# Patient Record
Sex: FEMALE | Race: WHITE | NOT HISPANIC OR LATINO | URBAN - METROPOLITAN AREA
[De-identification: names, ages, dates, MRNs, and addresses within clinical notes are randomized per-mention and may not be internally consistent; named-entity substitution may affect disease eponyms.]

---

## 2024-06-08 DIAGNOSIS — Z30.9 ENCOUNTER FOR CONTRACEPTIVE MANAGEMENT, UNSPECIFIED: ICD-10-CM

## 2024-06-13 RX ORDER — ESTRADIOL VALERATE AND ESTRADIOL VALERATE/DIENOGEST 3-2-1(28)
1 KIT ORAL DAILY
Qty: 84 TABLET | Refills: 0 | Status: SHIPPED | OUTPATIENT
Start: 2024-06-13

## 2024-06-21 ENCOUNTER — APPOINTMENT (OUTPATIENT)
Dept: OBSTETRICS AND GYNECOLOGY | Facility: CLINIC | Age: 54
End: 2024-06-21
Payer: COMMERCIAL

## 2024-06-21 VITALS
DIASTOLIC BLOOD PRESSURE: 60 MMHG | SYSTOLIC BLOOD PRESSURE: 106 MMHG | BODY MASS INDEX: 21.69 KG/M2 | HEIGHT: 66 IN | WEIGHT: 135 LBS

## 2024-06-21 DIAGNOSIS — N93.9 ABNORMAL UTERINE BLEEDING: Primary | ICD-10-CM

## 2024-06-21 DIAGNOSIS — Z12.31 SCREENING MAMMOGRAM FOR BREAST CANCER: ICD-10-CM

## 2024-06-21 PROCEDURE — 99396 PREV VISIT EST AGE 40-64: CPT | Performed by: OBSTETRICS & GYNECOLOGY

## 2024-06-21 PROCEDURE — 1036F TOBACCO NON-USER: CPT | Performed by: OBSTETRICS & GYNECOLOGY

## 2024-06-21 RX ORDER — OMEPRAZOLE 40 MG/1
40 CAPSULE, DELAYED RELEASE ORAL
COMMUNITY

## 2024-06-21 RX ORDER — VALACYCLOVIR HYDROCHLORIDE 500 MG/1
500 TABLET, FILM COATED ORAL 2 TIMES DAILY
COMMUNITY

## 2024-06-21 RX ORDER — CETIRIZINE HYDROCHLORIDE 10 MG/1
10 TABLET ORAL DAILY
COMMUNITY

## 2024-06-21 RX ORDER — ESTRADIOL VALERATE AND ESTRADIOL VALERATE/DIENOGEST 3-2-1(28)
1 KIT ORAL DAILY
Qty: 28 TABLET | Refills: 11 | Status: SHIPPED | OUTPATIENT
Start: 2024-06-21 | End: 2024-07-19

## 2024-06-21 RX ORDER — MONTELUKAST SODIUM 10 MG/1
10 TABLET ORAL NIGHTLY
COMMUNITY

## 2024-06-21 RX ORDER — ASCORBIC ACID 250 MG
250 TABLET ORAL DAILY
COMMUNITY

## 2024-06-21 NOTE — PROGRESS NOTES
Subjective   Patient ID: Betina Aggarwal is a 54 y.o. female who presents for Well woman visit.  Review of my note from annual exam 2023,,     Rubens Huerta MD  Physician  Specialty: Obstetrics and Gynecology     Progress Notes      Signed     Encounter Date: 4/28/2023    Signed           Patient Discussion/Summary     Established patient annual exam. 53 years old. 4 children. Prior endometrial ablation     Pap 2021 normal.     She has been on Natazia oral contraceptive. No history of heart attack stroke blood clots or breast cancer     We have reviewed the minimal risk of staying on oral contraceptives including 3-10 in 10,000 risk of DVT     She states that in March she went on a long trip with her  and had swelling redness and discomfort in the right calf that settled down after a few days. She never had this evaluated.     I would recommend she obtain a duplex ultrasound of the right leg to rule out any thrombotic event.     Exam breast abdominal pelvic exams are normal.     Woman exam. We will refill her Natazia but obtain an ultrasound of the right leg. Her daughter is a nurse and also has expressed concerns about her being on the pill and having a blood clot. Discussed that a potential DVT would be a contraindication to remaining on the pill      Chief Complaint     Here for annual exam.      Review of Systems  Constitutional: no fever,~no chills,~no recent weight gain,~no recent weight loss~and~no fatigue.   Eyes: no eye pain,~no vision problems~and~no dryness of the eyes.   ENT: no hearing loss,~no nosebleeds~and~no sinus congestion.   Cardiovascular: no chest pain,~no palpitations~and~no orthopnea.   Respiratory: no shortness of breath,~no cough~and~no wheezing.   Gastrointestinal: no abdominal pain,~no constipation,~no nausea,~no diarrhea~and~no vomiting.   Genitourinary: no dysuria,~no urinary incontinence,~no vaginal dryness,~no vaginal itching,~no dyspareunia,~no pelvic pain,~no dysmenorrhea,~no  sexual problems,~no change in urinary frequency,~no vaginal discharge,~no unexplained vaginal bleeding~and~no lesion/sore.   Musculoskeletal: no back pain,~no joint swelling~and~no leg edema.   Integumentary: no rashes,~no skin lesions,~no nipple discharge,~no breast pain~and~no breast lump.   Neurological: no headache,~no numbness~and~no dizziness.   Psychiatric: no sleep disturbances,~no anxiety~and~no depression.   Endocrine: no hot flashes,~no loss of hair~and~no hirsutism.   Hematologic/Lymphatic: no swollen glands,~no tendency for easy bleeding~and~no tendency for easy bruising.   All other systems have been reviewed and are negative for complaint.      Active Problems  Problems    · Abnormal color of tongue (529.8) (K14.8)   · Acute bronchitis (466.0) (J20.9)   · Acute sinusitis (461.9) (J01.90)   · Cervical smear, as part of routine gynecological examination (V76.2) (Z01.419)   · Chest pain (786.50) (R07.9)   · Contraception management (V25.9) (Z30.9)   · Cough (786.2) (R05.9)   · Encounter for Papanicolaou smear of cervix (V76.2) (Z12.4)   · Encounter for routine gynecological examination (V72.31) (Z01.419)   · Encounter for screening for lipoid disorders (V77.91) (Z13.220)   · Fatigue (780.79) (R53.83)   · Hematuria (599.70) (R31.9)   · IBS (irritable bowel syndrome) (564.1) (K58.9)   · Nausea (787.02) (R11.0)   · Pharyngitis (462) (J02.9)   · Visit for screening mammogram (V76.12) (Z12.31)     Past Medical History  Problems    · IBS (irritable bowel syndrome) (564.1) (K58.9)     Surgical History  Problems    · History of Hernia Repair   · left inguinal.  2005.   · History of Tubal Ligation     Family History  Mother    · Family history of cerebrovascular accident (CVA) (V17.1) (Z82.3)   · age 72.   · Family history of hyperlipidemia (V18.19) (Z83.438)  Father    · Family history of Throat cancer  Brother    · Family history of Malignant melanoma     Social History  Problems    · Never a smoker   · Never  smoker   · Parent   · 4 children.  son with svt.   · Rarely consumes alcohol (V49.89) (Z78.9)     Allergies  Medication    · No Known Drug Allergies  Recorded By: Irma Contreras; 2013 1:49:53 PM     Current Meds       Medication Name Instruction  Natkathy 3/2-2/2-3/1 MG Oral Tablet TAKE 1 TABLET DAILY AS DIRECTED.     Vitals  Vital Signs       Recorded: 2023 09:45AM  Systolic 112  Diastolic 62  Height 5 ft 6 in  Weight 132 lb   BMI Calculated 21.31 kg/m2  BSA Calculated 1.68  LMP [id number]   5  Para 4     Physical Exam     Constitutional: Alert and in no acute distress. Well developed, well nourished   Head and Face: Head and face: normal   Eyes: Normal external exam - nonicteric sclera, extraocular movements intact (EOMI) and no ptosis.   Ears, Nose, Mouth, and Throat: External inspection of ears and nose: normal   Neck: no neck asymmetry. Supple~and~thyroid not enlarged and there were no palpable thyroid nodules   Cardiovascular: Heart rate and rhythm were normal, normal S1 and S2, no gallops, and no murmurs   Pulmonary: No respiratory distress~and~clear bilateral breath sounds   Chest: Breasts: normal appearance, no nipple discharge and no skin changes~and~palpation of breasts and axillae: no palpable mass and no axillary lymphadenopathy   Abdomen: soft nontender; no abdominal mass palpated,~no organomegaly~and~no hernias   Genitourinary: external genitalia: normal,~no inguinal lymphadenopathy,~Bartholin's urethral and [place]'s glands: normal,~urethra: normal,~bladder: normal on palpation~and~perianal area: normal   Vagina: normal.   Cervix: Normal.   Uterus: Normal.   Right Adnexa/parametria: Normal.   Left Adnexa/parametria: Normal.   Musculoskeletal: no joint swelling seen, normal movements of all extremities   Skin: normal skin color and pigmentation, normal skin turgor, and no rash.   Neurologic: cranial nerves: non-focal. grossly intact   Psychiatric: alert and oriented x 3.,~affect normal to  patient baseline~and~mood: appropriate      Results/Data    IO UA (nonautomated w/o microscopy) 28Apr2023 09:44AM Rubens Huerta       Test Name Result Flag Reference  IO Glucose - Urine Negative      IO Blood Negative      IO Protein, Urine Negative      IO Nitrite, Urine Negative      IO Leukocytes Negative             PAP GYN, Cytology Final     No Documents Attached               Accession #: [id number] Date of Procedure: 10/1/2021  Pathologist: [facility], Cytology  Date Reported: 10/14/2021  Date Received: 10/1/2021  Submitting Physician: RUBENS HUERTA MD           FINAL CYTOLOGICAL INTERPRETATION           A. THINPREP PAP CERVICAL:      Specimen Adequacy:   SATISFACTORY FOR EVALUATION.   Quality Indicator: Absence of endocervical/transformation zone component.      General Categorization:   NEGATIVE FOR INTRAEPITHELIAL LESION OR MALIGNANCY.            HIGH RISK HPV TEST RESULT:      HPV GENOTYPE 16  NEGATIVE   HPV GENOTYPE 18  NEGATIVE   HPV GENOTYPE OTHER NEGATIVE      Reference Range: Negative           Slide(s) initially screened by a Cytotechnologist at [facility], [address] [place] [id number]     QC review performed at [facility], [address] [place] [id number]     Testing for high-risk (HR) type of human papilloma virus (HPV) is performed by  the Roche kavon HPV Test. The kavon HPV Test is a qualitative polymerase chain  reaction that amplifies DNA of HPV16, HPV18 and 12 other high-risk HPV types  (31, 33, 35, 39, 45, 51, 52, 56, 58, 59, 66, and 68) associated with cervical  cancer and its precursor lesions. A positive result indicates the presence of  HPV DNA due to one or more of the 14 genotypes: 16, 18, 31, 33, 35, 39, 45, 51,  52, 56, 58, 59, 66, and 68. Negative results indicate HPV DNA concentrations  are undetectable or below the pre-set threshold for detection. False negative  results may be  associated with unoptimized sampling. A negative HR HPV result  does not exclude the possibility of future cytologic HSIL or underlying CIN2-3  or cancer.     This test is approved for cervical specimens by the US Food and Drug  Administration. Results of this test should be interpreted in conjunction with  the patient?s Pap test results. Please refer to ASCCP current guidelines for  the use of HPV DNA testing, result interpretation, and patient management.  The performance of this test was verified by the Molecular Diagnostic  Laboratory at [facility]. The lab is  certified under the Clinical Laboratory Amendments of 1988 (CLIA 88) as  qualified to perform high complexity clinical laboratory testing.     This specimen has been analyzed by the [employer] Imaging System ([employer]  an automated imaging and review system, which assists the laboratory in  evaluating cells on ThinPrep Pap tests. Following automated imaging, selected  fields from every slide were reviewed by a cytotechnologist and/or pathologist.        Electronically Signed Out By [facility],  Cytology//LSM/SLD  By the signature on this report, the individual or group listed as making the  Final Interpretation/Diagnosis certifies that they have reviewed this case.     Educational Note:  Cervical cytology is a screening procedure primarily for squamous cancers and  precursors and has associated false-negative and false-positive results as  evidenced by published data. Your patient?s test should be interpreted in this  context, together with patient?s history and clinical findings. Regular  sampling and follow-up of unexplained clinical signs and symptoms are  recommended to minimize false negative results.           Clinical History  Date of Last Menstrual Period: 09/07/2021     Other Clinical Conditions:  COTEST HPV(Genotype) except for ASC-H, HSIL, Carcinoma - Include HPV Genotype  testing      Clinical Diagnosis History: Cervical smear, as part of routine gynecological  examination - (Z01.419)   Source of Specimen  A: THINPREP PAP CERVICAL              [facility]  Department of Pathology  [address] [place] [address]            Ordered by: Rubens Huerta Collected/Examined: 01Oct2021 11:24AM   Verified by: Rubens Huerta 14Oct2021 11:57PM    Result Communication: No patient communication needed at this time;  Stage: Final    Performed at: [facility] Cytology Resulted: 01Oct2021 02:06PM Last Updated: 14Oct2021 11:57PM Accession: [id number]      Signatures   Electronically signed by : Rubens Huerta MD; Apr 28 2023  9:59AM EST (Author)               Last signed by: Rubens Huerta MD at 4/28/2023  9:59 AM    Patient did have ultrasound of right calf.  Ultrasound negative for DVT in 2023.  4 children.  Prior endometrial ablation.  Last Pap 2021 normal.    She is still having withdrawal bleeds on the Natazia.  We discussed staying on it till age 55.  After that we would see if she still had bleeding or any symptoms.  We could always get subsequent blood work to assess for menopause    On exam Slim  female.  Breast abdominal pelvic exams normal.  Order for mammogram.  Well woman exam.  Refill Natazia.  Stop Natazia in 1 year.        Review of Systems   All other systems reviewed and are negative.      Objective   Physical Exam  Constitutional:       Appearance: Normal appearance. She is normal weight.   Chest:   Breasts:     Right: Normal.      Left: Normal.   Genitourinary:     General: Normal vulva.      Vagina: Normal.      Cervix: Normal.      Uterus: Normal.       Adnexa: Right adnexa normal and left adnexa normal.   Neurological:      Mental Status: She is alert.         Assessment/Plan   Well woman exam.  Order mammogram.  Refill Natazia.  Discontinue Natazia in 2025 and assess for menopause symptoms .  Prior tubal ligation.  Mainly on the Natazia to  help with abnormal bleeding.         Rubens Huerta MD 06/21/24 8:53 AM

## 2024-08-25 ENCOUNTER — LAB REQUISITION (OUTPATIENT)
Dept: LAB | Facility: HOSPITAL | Age: 54
End: 2024-08-25
Payer: COMMERCIAL

## 2024-08-25 DIAGNOSIS — N39.0 URINARY TRACT INFECTION, SITE NOT SPECIFIED: ICD-10-CM

## 2024-08-25 DIAGNOSIS — R30.0 DYSURIA: ICD-10-CM

## 2024-08-25 PROCEDURE — 87186 SC STD MICRODIL/AGAR DIL: CPT

## 2024-08-25 PROCEDURE — 87086 URINE CULTURE/COLONY COUNT: CPT

## 2024-08-28 LAB — BACTERIA UR CULT: ABNORMAL

## 2025-01-17 ENCOUNTER — OFFICE VISIT (OUTPATIENT)
Dept: PRIMARY CARE | Facility: CLINIC | Age: 55
End: 2025-01-17
Payer: COMMERCIAL

## 2025-01-17 ENCOUNTER — APPOINTMENT (OUTPATIENT)
Dept: LAB | Facility: LAB | Age: 55
End: 2025-01-17
Payer: COMMERCIAL

## 2025-01-17 VITALS
WEIGHT: 137 LBS | DIASTOLIC BLOOD PRESSURE: 66 MMHG | RESPIRATION RATE: 16 BRPM | HEART RATE: 85 BPM | HEIGHT: 66 IN | TEMPERATURE: 97.8 F | OXYGEN SATURATION: 99 % | SYSTOLIC BLOOD PRESSURE: 114 MMHG | BODY MASS INDEX: 22.02 KG/M2

## 2025-01-17 DIAGNOSIS — Z00.00 ROUTINE GENERAL MEDICAL EXAMINATION AT A HEALTH CARE FACILITY: Primary | ICD-10-CM

## 2025-01-17 DIAGNOSIS — Z00.00 ROUTINE MEDICAL EXAM: ICD-10-CM

## 2025-01-17 LAB
BASOPHILS # BLD AUTO: 0.02 X10*3/UL (ref 0–0.1)
BASOPHILS NFR BLD AUTO: 0.3 %
EOSINOPHIL # BLD AUTO: 0.08 X10*3/UL (ref 0–0.7)
EOSINOPHIL NFR BLD AUTO: 1.3 %
ERYTHROCYTE [DISTWIDTH] IN BLOOD BY AUTOMATED COUNT: 12.3 % (ref 11.5–14.5)
HCT VFR BLD AUTO: 40.7 % (ref 36–46)
HGB BLD-MCNC: 13 G/DL (ref 12–16)
IMM GRANULOCYTES # BLD AUTO: 0.01 X10*3/UL (ref 0–0.7)
IMM GRANULOCYTES NFR BLD AUTO: 0.2 % (ref 0–0.9)
LYMPHOCYTES # BLD AUTO: 1.5 X10*3/UL (ref 1.2–4.8)
LYMPHOCYTES NFR BLD AUTO: 24.5 %
MCH RBC QN AUTO: 30.2 PG (ref 26–34)
MCHC RBC AUTO-ENTMCNC: 31.9 G/DL (ref 32–36)
MCV RBC AUTO: 94 FL (ref 80–100)
MONOCYTES # BLD AUTO: 0.49 X10*3/UL (ref 0.1–1)
MONOCYTES NFR BLD AUTO: 8 %
NEUTROPHILS # BLD AUTO: 4.01 X10*3/UL (ref 1.2–7.7)
NEUTROPHILS NFR BLD AUTO: 65.7 %
NRBC BLD-RTO: 0 /100 WBCS (ref 0–0)
PLATELET # BLD AUTO: 232 X10*3/UL (ref 150–450)
RBC # BLD AUTO: 4.31 X10*6/UL (ref 4–5.2)
TSH SERPL-ACNC: 1.36 MIU/L (ref 0.44–3.98)
WBC # BLD AUTO: 6.1 X10*3/UL (ref 4.4–11.3)

## 2025-01-17 PROCEDURE — 85025 COMPLETE CBC W/AUTO DIFF WBC: CPT

## 2025-01-17 PROCEDURE — 99396 PREV VISIT EST AGE 40-64: CPT | Performed by: FAMILY MEDICINE

## 2025-01-17 PROCEDURE — 36415 COLL VENOUS BLD VENIPUNCTURE: CPT

## 2025-01-17 PROCEDURE — 84443 ASSAY THYROID STIM HORMONE: CPT

## 2025-01-17 PROCEDURE — 3008F BODY MASS INDEX DOCD: CPT | Performed by: FAMILY MEDICINE

## 2025-01-17 PROCEDURE — 80061 LIPID PANEL: CPT

## 2025-01-17 PROCEDURE — 80053 COMPREHEN METABOLIC PANEL: CPT

## 2025-01-17 PROCEDURE — 1036F TOBACCO NON-USER: CPT | Performed by: FAMILY MEDICINE

## 2025-01-17 ASSESSMENT — COLUMBIA-SUICIDE SEVERITY RATING SCALE - C-SSRS
2. HAVE YOU ACTUALLY HAD ANY THOUGHTS OF KILLING YOURSELF?: NO
6. HAVE YOU EVER DONE ANYTHING, STARTED TO DO ANYTHING, OR PREPARED TO DO ANYTHING TO END YOUR LIFE?: NO
1. IN THE PAST MONTH, HAVE YOU WISHED YOU WERE DEAD OR WISHED YOU COULD GO TO SLEEP AND NOT WAKE UP?: NO

## 2025-01-17 ASSESSMENT — ENCOUNTER SYMPTOMS
OCCASIONAL FEELINGS OF UNSTEADINESS: 0
MUSCULOSKELETAL NEGATIVE: 1
LOSS OF SENSATION IN FEET: 0
CONSTITUTIONAL NEGATIVE: 1
NEUROLOGICAL NEGATIVE: 1
RESPIRATORY NEGATIVE: 1
DEPRESSION: 0
CARDIOVASCULAR NEGATIVE: 1
GASTROINTESTINAL NEGATIVE: 1

## 2025-01-17 ASSESSMENT — PATIENT HEALTH QUESTIONNAIRE - PHQ9
2. FEELING DOWN, DEPRESSED OR HOPELESS: NOT AT ALL
1. LITTLE INTEREST OR PLEASURE IN DOING THINGS: NOT AT ALL
SUM OF ALL RESPONSES TO PHQ9 QUESTIONS 1 AND 2: 0

## 2025-01-17 ASSESSMENT — PAIN SCALES - GENERAL: PAINLEVEL_OUTOF10: 0-NO PAIN

## 2025-01-17 NOTE — PROGRESS NOTES
"Subjective   Patient ID: Betina Aggarwal is a 55 y.o. female who presents for Annual Exam (Pt is here for pe and fbw.).    HPI she had mammo 6/2024.  1/2024 colonoscopy recheck 10 years.  Never a smoker .  No etoh.   She works out regularly.     Review of Systems   Constitutional: Negative.    HENT: Negative.     Respiratory: Negative.     Cardiovascular: Negative.    Gastrointestinal: Negative.    Genitourinary: Negative.    Musculoskeletal: Negative.    Neurological: Negative.        Objective   /66 (BP Location: Left arm, Patient Position: Sitting, BP Cuff Size: Adult)   Pulse 85   Temp 36.6 °C (97.8 °F) (Temporal)   Resp 16   Ht 1.676 m (5' 6\")   Wt 62.1 kg (137 lb)   SpO2 99%   BMI 22.11 kg/m²     Physical Exam  Vitals and nursing note reviewed.   Constitutional:       General: She is not in acute distress.  HENT:      Right Ear: Tympanic membrane and ear canal normal.      Left Ear: Tympanic membrane and ear canal normal.      Nose: Nose normal. No rhinorrhea.      Mouth/Throat:      Pharynx: Oropharynx is clear. No oropharyngeal exudate or posterior oropharyngeal erythema.      Comments: Dentition wnl  Eyes:      Extraocular Movements: Extraocular movements intact.      Conjunctiva/sclera: Conjunctivae normal.      Pupils: Pupils are equal, round, and reactive to light.   Neck:      Vascular: No carotid bruit.   Cardiovascular:      Rate and Rhythm: Normal rate and regular rhythm.      Heart sounds: Normal heart sounds. No murmur heard.  Pulmonary:      Breath sounds: Normal breath sounds. No wheezing or rhonchi.   Abdominal:      General: Bowel sounds are normal. There is no distension.      Palpations: Abdomen is soft. There is no mass.      Tenderness: There is no abdominal tenderness. There is no guarding or rebound.      Hernia: No hernia is present.   Musculoskeletal:         General: No swelling or tenderness. Normal range of motion.      Cervical back: Normal range of motion and neck " supple.   Lymphadenopathy:      Cervical: No cervical adenopathy.   Skin:     General: Skin is warm.      Findings: No rash.   Neurological:      General: No focal deficit present.      Mental Status: She is alert.         Assessment/Plan   Problem List Items Addressed This Visit    None  Visit Diagnoses         Codes    Routine general medical examination at a health care facility    -  Primary Z00.00    Relevant Orders    CBC and Auto Differential    Comprehensive Metabolic Panel    TSH with reflex to Free T4 if abnormal    Lipid Panel    Routine medical exam     Z00.00    Relevant Orders    CT cardiac scoring wo IV contrast

## 2025-01-18 LAB
ALBUMIN SERPL BCP-MCNC: 4.5 G/DL (ref 3.4–5)
ALP SERPL-CCNC: 48 U/L (ref 33–110)
ALT SERPL W P-5'-P-CCNC: 11 U/L (ref 7–45)
ANION GAP SERPL CALC-SCNC: 13 MMOL/L (ref 10–20)
AST SERPL W P-5'-P-CCNC: 15 U/L (ref 9–39)
BILIRUB SERPL-MCNC: 0.6 MG/DL (ref 0–1.2)
BUN SERPL-MCNC: 18 MG/DL (ref 6–23)
CALCIUM SERPL-MCNC: 9.1 MG/DL (ref 8.6–10.3)
CHLORIDE SERPL-SCNC: 106 MMOL/L (ref 98–107)
CHOLEST SERPL-MCNC: 189 MG/DL (ref 0–199)
CHOLESTEROL/HDL RATIO: 3.3
CO2 SERPL-SCNC: 24 MMOL/L (ref 21–32)
CREAT SERPL-MCNC: 0.85 MG/DL (ref 0.5–1.05)
EGFRCR SERPLBLD CKD-EPI 2021: 82 ML/MIN/1.73M*2
GLUCOSE SERPL-MCNC: 85 MG/DL (ref 74–99)
HDLC SERPL-MCNC: 57.6 MG/DL
LDLC SERPL CALC-MCNC: 115 MG/DL
NON HDL CHOLESTEROL: 131 MG/DL (ref 0–149)
POTASSIUM SERPL-SCNC: 4 MMOL/L (ref 3.5–5.3)
PROT SERPL-MCNC: 7 G/DL (ref 6.4–8.2)
SODIUM SERPL-SCNC: 139 MMOL/L (ref 136–145)
TRIGL SERPL-MCNC: 80 MG/DL (ref 0–149)
VLDL: 16 MG/DL (ref 0–40)

## 2025-01-31 ENCOUNTER — HOSPITAL ENCOUNTER (OUTPATIENT)
Dept: RADIOLOGY | Facility: HOSPITAL | Age: 55
Discharge: HOME | End: 2025-01-31
Payer: COMMERCIAL

## 2025-01-31 DIAGNOSIS — Z12.31 SCREENING MAMMOGRAM FOR BREAST CANCER: ICD-10-CM

## 2025-01-31 PROCEDURE — 77063 BREAST TOMOSYNTHESIS BI: CPT | Performed by: STUDENT IN AN ORGANIZED HEALTH CARE EDUCATION/TRAINING PROGRAM

## 2025-01-31 PROCEDURE — 77067 SCR MAMMO BI INCL CAD: CPT | Performed by: STUDENT IN AN ORGANIZED HEALTH CARE EDUCATION/TRAINING PROGRAM

## 2025-01-31 PROCEDURE — 77067 SCR MAMMO BI INCL CAD: CPT

## 2025-04-26 ENCOUNTER — HOSPITAL ENCOUNTER (OUTPATIENT)
Dept: RADIOLOGY | Facility: HOSPITAL | Age: 55
Discharge: HOME | End: 2025-04-26
Payer: COMMERCIAL

## 2025-04-26 DIAGNOSIS — Z00.00 ROUTINE MEDICAL EXAM: ICD-10-CM

## 2025-04-26 PROCEDURE — 75571 CT HRT W/O DYE W/CA TEST: CPT

## 2025-05-01 ASSESSMENT — DERMATOLOGY QUALITY OF LIFE (QOL) ASSESSMENT
RATE HOW EMOTIONALLY BOTHERED YOU ARE BY YOUR SKIN PROBLEM (FOR EXAMPLE, WORRY, EMBARRASSMENT, FRUSTRATION): 3
RATE HOW BOTHERED YOU ARE BY SYMPTOMS OF YOUR SKIN PROBLEM (EG, ITCHING, STINGING BURNING, HURTING OR SKIN IRRITATION): 0 - NEVER BOTHERED
RATE HOW BOTHERED YOU ARE BY EFFECTS OF YOUR SKIN PROBLEMS ON YOUR ACTIVITIES (EG, GOING OUT, ACCOMPLISHING WHAT YOU WANT, WORK ACTIVITIES OR YOUR RELATIONSHIPS WITH OTHERS): 3
RATE HOW EMOTIONALLY BOTHERED YOU ARE BY YOUR SKIN PROBLEM (FOR EXAMPLE, WORRY, EMBARRASSMENT, FRUSTRATION): 3
RATE HOW BOTHERED YOU ARE BY EFFECTS OF YOUR SKIN PROBLEMS ON YOUR ACTIVITIES (EG, GOING OUT, ACCOMPLISHING WHAT YOU WANT, WORK ACTIVITIES OR YOUR RELATIONSHIPS WITH OTHERS): 3
RATE HOW BOTHERED YOU ARE BY SYMPTOMS OF YOUR SKIN PROBLEM (EG, ITCHING, STINGING BURNING, HURTING OR SKIN IRRITATION): 0 - NEVER BOTHERED

## 2025-05-02 ENCOUNTER — APPOINTMENT (OUTPATIENT)
Dept: DERMATOLOGY | Facility: CLINIC | Age: 55
End: 2025-05-02
Payer: COMMERCIAL

## 2025-05-02 DIAGNOSIS — L57.9 SKIN CHANGES DUE TO CHRONIC EXPOSURE TO NONIONIZING RADIATION: ICD-10-CM

## 2025-05-02 DIAGNOSIS — L81.4 LENTIGO: ICD-10-CM

## 2025-05-02 DIAGNOSIS — Z80.8 FAMILY HISTORY OF MELANOMA: ICD-10-CM

## 2025-05-02 DIAGNOSIS — L82.1 SEBORRHEIC KERATOSIS: ICD-10-CM

## 2025-05-02 DIAGNOSIS — D18.01 ANGIOMA OF SKIN: Primary | ICD-10-CM

## 2025-05-02 DIAGNOSIS — L57.0 ACTINIC KERATOSIS: ICD-10-CM

## 2025-05-02 DIAGNOSIS — D22.9 BENIGN NEVUS: ICD-10-CM

## 2025-05-02 PROCEDURE — 1036F TOBACCO NON-USER: CPT | Performed by: NURSE PRACTITIONER

## 2025-05-02 PROCEDURE — 99203 OFFICE O/P NEW LOW 30 MIN: CPT | Performed by: NURSE PRACTITIONER

## 2025-05-02 NOTE — PATIENT INSTRUCTIONS

## 2025-05-02 NOTE — PROGRESS NOTES
Subjective     Katherin Aggarwal is a 55 y.o. female who presents for the following: Skin Check.     Review of Systems:  No other skin or systemic complaints other than what is documented elsewhere in the note.    The following portions of the chart were reviewed this encounter and updated as appropriate:   Tobacco  Allergies  Meds  Problems  Med Hx  Surg Hx         Skin Cancer History  Biopsy Log Book  No skin cancers from Specimen Tracking.    Additional History      Specialty Problems    None       Objective   Well appearing patient in no apparent distress; mood and affect are within normal limits.    A full examination was performed including scalp, head, eyes, ears, nose, lips, neck, chest, axillae, abdomen, back, buttocks, bilateral upper extremities, bilateral lower extremities, hands, feet, fingers, toes, fingernails, and toenails. All findings within normal limits unless otherwise noted below.      Assessment/Plan   Skin Exam  1. ANGIOMA OF SKIN  Generalized  Scattered cherry-red papule(s).  A cherry hemangioma is a small macule (small, flat, smooth area) or papule (small, solid bump) formed from an overgrowth of tiny blood vessels in the skin. Cherry hemangiomas are characteristically red or purplish in color. They often first appear in middle adulthood and usually increase in number with age. Cherry hemangiomas are noncancerous (benign) and are common in adults.    The present appearance of the lesion is not worrisome but it should continue to be observed and testing/treatment may be warranted if change occurs.  2. BENIGN NEVUS  Generalized  Scattered, uniform and benign-appearing, regular brown melanocytic papules and macules.  The present appearance of the lesion is not worrisome but it should continue to be observed and testing/treatment may be warranted if change occurs.  3. SEBORRHEIC KERATOSIS  Generalized  Stuck on verrucous, tan-brown papules and plaques.    Seborrheic keratoses are common  noncancerous (benign) growths of unknown cause seen in adults due to a thickening of an area of the top skin layer. Seborrheic keratoses may appear as if they are stuck on to the skin. They have distinct borders, and they may appear as papules (small, solid bumps) or plaques (solid, raised patches that are bigger than a thumbnail). They may be the same color as your skin, or they may be pink, light brown, darker brown, or very dark brown, or sometimes may appear black.    There is no way to prevent new seborrheic keratoses from forming. Seborrheic keratoses can be removed, but removal is considered a cosmetic issue and is usually not covered by insurance.    PLAN  No treatment is needed unless there is irritation from clothing, such as itching or bleeding.  2.   Some lotions containing alpha hydroxy acids, salicylic acid, or urea may make the areas feel smoother with regular use but will not eliminate them.  4. LENTIGO  Generalized  Scattered tan macules in sun-exposed areas.  A solar lentigo (plural, solar lentigines), also known as a sun-induced freckle or senile lentigo, is a dark (hyperpigmented) lesion caused by natural or artificial ultraviolet (UV) light. Solar lentigines may be single or multiple. This type of lentigo is different from a simple lentigo (lentigo simplex) because it is caused by exposure to UV light. Solar lentigines are benign, but they do indicate excessive sun exposure, a risk factor for the development of skin cancer.    To prevent solar lentigines, avoid exposure to sunlight in midday (10 AM to 3 PM), wear sun-protective clothing (tightly woven clothes and hats), and apply sunscreen (SPF 30 UVA and UVB block).    The present appearance of the lesion is not worrisome but it should continue to be observed and testing/treatment may be warranted if change occurs.  5. SKIN CHANGES DUE TO CHRONIC EXPOSURE TO NONIONIZING RADIATION  Generalized  Actinic changes in the form of freckles, lentigines  and hyper/hypopigmentation   ABCDEs of melanoma and atypical moles were discussed with the patient.    Patient was instructed to perform monthly self skin examination.  We recommended that the patient have regular full skin exams given an increased risk of subsequent skin cancers.    The patient was instructed to use sun protective behaviors including use of broad spectrum sunscreens and sun protective clothing to reduce risk of skin cancers.    Warning signs of non-melanoma skin cancer discussed.  6. FAMILY HISTORY OF MELANOMA  Generalized  Brother  from melanoma  7. ACTINIC KERATOSIS  Generalized  No erythematous macules with gritty scale.  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 Last treated a couple years ago at [facility]. Will request records to determine previous biopsies performed and results.     Return to clinic in 1 year for skin check/follow up or sooner if needed

## 2025-05-02 NOTE — Clinical Note
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 Last treated a couple years ago at [facility]. Will request records to determine previous biopsies performed and results.

## 2025-05-22 ENCOUNTER — APPOINTMENT (OUTPATIENT)
Dept: PRIMARY CARE | Facility: CLINIC | Age: 55
End: 2025-05-22
Payer: COMMERCIAL

## 2025-05-22 ASSESSMENT — LIFESTYLE VARIABLES: HISTORY_OF_SMOKING: I HAVE NEVER SMOKED

## 2025-06-27 ENCOUNTER — APPOINTMENT (OUTPATIENT)
Dept: OBSTETRICS AND GYNECOLOGY | Facility: CLINIC | Age: 55
End: 2025-06-27
Payer: COMMERCIAL

## 2025-06-27 VITALS
DIASTOLIC BLOOD PRESSURE: 66 MMHG | BODY MASS INDEX: 20.89 KG/M2 | SYSTOLIC BLOOD PRESSURE: 104 MMHG | HEIGHT: 66 IN | WEIGHT: 130 LBS

## 2025-06-27 DIAGNOSIS — Z01.419 WELL WOMAN EXAM: Primary | ICD-10-CM

## 2025-06-27 DIAGNOSIS — Z79.890 HORMONE REPLACEMENT THERAPY, POSTMENOPAUSAL: ICD-10-CM

## 2025-06-27 DIAGNOSIS — N95.1 MENOPAUSAL SYMPTOMS: ICD-10-CM

## 2025-06-27 PROCEDURE — 99396 PREV VISIT EST AGE 40-64: CPT | Performed by: OBSTETRICS & GYNECOLOGY

## 2025-06-27 PROCEDURE — 3008F BODY MASS INDEX DOCD: CPT | Performed by: OBSTETRICS & GYNECOLOGY

## 2025-06-27 PROCEDURE — 99213 OFFICE O/P EST LOW 20 MIN: CPT | Performed by: OBSTETRICS & GYNECOLOGY

## 2025-06-27 RX ORDER — MEDROXYPROGESTERONE ACETATE 2.5 MG/1
2.5 TABLET ORAL DAILY
Qty: 90 TABLET | Refills: 3 | Status: SHIPPED | OUTPATIENT
Start: 2025-06-27 | End: 2026-06-22

## 2025-06-27 RX ORDER — ESTRADIOL 1 MG/1
1 TABLET ORAL DAILY
Qty: 90 TABLET | Refills: 3 | Status: SHIPPED | OUTPATIENT
Start: 2025-06-27 | End: 2026-06-27

## 2025-06-27 NOTE — PROGRESS NOTES
"Subjective   Patient ID: Betina Aggarwal \"Katherin\" is a 55 y.o. female who presents for well woman exam.  Here for annual exam, review of my last note from 1 year ago,  Patient did have ultrasound of right calf.  Ultrasound negative for DVT in 2023.  4 children.  Prior endometrial ablation.  Last Pap 2021 normal.     She is still having withdrawal bleeds on the Natazia.  We discussed staying on it till age 55.  After that we would see if she still had bleeding or any symptoms.  We could always get subsequent blood work to assess for menopause     On exam Slim  female.  Breast abdominal pelvic exams normal.  Order for mammogram.  Well woman exam.  Refill Natazia.  Stop Natazia in 1 year.           Review of Systems   All other systems reviewed and are negative.        Objective  Physical Exam  Constitutional:       Appearance: Normal appearance. She is normal weight.   Chest:   Breasts:     Right: Normal.      Left: Normal.   Genitourinary:     General: Normal vulva.      Vagina: Normal.      Cervix: Normal.      Uterus: Normal.       Adnexa: Right adnexa normal and left adnexa normal.   Neurological:      Mental Status: She is alert.            Assessment/Plan  Well woman exam.  Order mammogram.  Refill Natazia.  Discontinue Natazia in 2025 and assess for menopause symptoms.  Prior tubal ligation.  Mainly on the Natazia to help with abnormal bleeding.        Rubens Huerta MD 06/21/24 8:53 AM         Electronically signed by Rubens Huerta MD at 6/21/2024  9:04 AM    Presenting for annual exam today she stopped her birth control pills in April.  Has not had a menses but is feeling more emotional hot flashes nauseated.    She is on meds for allergy and asthma.  Non-smoker.    No history of heart attack stroke blood clots or breast cancer.  Last Pap 2021.  Next Pap 2026    On exam some  female.  Breast abdominal pelvic exams normal.  Well woman exam.  Mammogram    Reviewed pros and cons of trial of " low-dose HRT.  Prescribe estradiol and Provera.  Reviewed side effects.  Follow-up 3 months.  She is up-to-date on mammogram which she had this past january,  BI mammo bilateral screening tomosynthesis  Status: Final result    Study Result    Narrative & Impression  Interpreted By:  Juancarlos Davis,   STUDY:  BI MAMMO BILATERAL SCREENING TOMOSYNTHESIS;  1/31/2025 10:20 am      ACCESSION NUMBER(S):  [id number]      ORDERING CLINICIAN:  CAIN MENDEZ      INDICATION:  Screening.      ,Z12.31 Encounter for screening mammogram for malignant neoplasm of  breast      COMPARISON:  05/26/2023 and all relevant prior breast imaging exams available at  the time of dictation.      FINDINGS:  2D and tomosynthesis images were reviewed at 1 mm slice thickness.      Density:  The breasts are heterogeneously dense, which may obscure  small masses.      No suspicious masses or calcifications are identified.      IMPRESSION:  No mammographic evidence of malignancy.      BI-RADS CATEGORY:  BI-RADS Category:  1 Negative.  Recommendation:  Annual Screening.  Recommended Date:  1 Year.  Laterality:  Bilateral.              For any future breast imaging appointments, please call [phone]  ([phone]).          MACRO:  None      Signed by: Juancarlos Davis 2/2/2025 9:07 AM  Dictation workstation:   [id number]              Review of Systems   All other systems reviewed and are negative.      Objective   Physical Exam  Constitutional:       Appearance: Normal appearance. She is normal weight.   Chest:   Breasts:     Right: Normal.      Left: Normal.   Genitourinary:     General: Normal vulva.      Vagina: Normal.      Cervix: Normal.      Uterus: Normal.       Adnexa: Right adnexa normal and left adnexa normal.   Neurological:      Mental Status: She is alert.         Assessment/Plan   Well woman exam.  Stop birth control pills in April.  Menopausal symptoms.  Trial of estradiol and Provera.  Mammogram.  Follow-up 3 months.  Reviewed the  differences in oral contraceptives versus menopausal HRT         Rubens Huerta MD 06/27/25 9:24 AM

## 2025-10-17 ENCOUNTER — APPOINTMENT (OUTPATIENT)
Dept: OBSTETRICS AND GYNECOLOGY | Facility: CLINIC | Age: 55
End: 2025-10-17
Payer: COMMERCIAL

## 2025-10-24 ENCOUNTER — APPOINTMENT (OUTPATIENT)
Dept: OBSTETRICS AND GYNECOLOGY | Facility: CLINIC | Age: 55
End: 2025-10-24
Payer: COMMERCIAL

## 2026-05-08 ENCOUNTER — APPOINTMENT (OUTPATIENT)
Dept: DERMATOLOGY | Facility: CLINIC | Age: 56
End: 2026-05-08
Payer: COMMERCIAL